# Patient Record
Sex: FEMALE | URBAN - METROPOLITAN AREA
[De-identification: names, ages, dates, MRNs, and addresses within clinical notes are randomized per-mention and may not be internally consistent; named-entity substitution may affect disease eponyms.]

---

## 2018-03-13 ENCOUNTER — CONSULTATION (OUTPATIENT)
Dept: URBAN - METROPOLITAN AREA CLINIC 11 | Facility: CLINIC | Age: 83
End: 2018-03-13

## 2018-03-13 VITALS — HEIGHT: 55 IN | DIASTOLIC BLOOD PRESSURE: 70 MMHG | SYSTOLIC BLOOD PRESSURE: 110 MMHG

## 2018-03-13 ASSESSMENT — VISUAL ACUITY
OD_PH: 20/40+1
OS_SC: 20/200
OD_SC: 20/40-2
OS_PH: 20/70-1

## 2018-03-13 ASSESSMENT — TONOMETRY
OS_IOP_MMHG: 17
OD_IOP_MMHG: 15

## 2018-03-13 NOTE — PATIENT DISCUSSION
Greater than 50% of exam spent in dialogue with patient and her daughter. No SRF seen today on OCT or careful examination. I have asked her to call is she notices any changes or has any concerns.

## 2018-07-30 NOTE — PATIENT DISCUSSION
(U52.696) Keratoconjunct sicca, not specified as Sjogren's, bilateral - Assesment : Examination revealed Dry Eye Syndrome - Plan : Monitor for changes. ATs 2-3 times per day and prn. Refresh Optive coupon given and recommended for use with and without CL wear.

## 2018-07-30 NOTE — PATIENT DISCUSSION
(Z01.00) Encounter for exam of eyes and vision w/o abnormal findings - Assesment : Examination reveals normal health eyes. - Plan : Monitor for changes. Updated CLRx given today. Call if vision changes. RTC in 1 years for CL Exam, sooner if problems or changes.

## 2020-03-17 NOTE — PATIENT DISCUSSION
Monitor for changes. ATs 2-3 times per day and prn. Refresh Optive coupon given and recommended for use with and without CL wear.

## 2020-03-19 NOTE — PATIENT DISCUSSION
Monitor for changes. Updated GLRx and CL Trials given today. Pt to follow with Joanna Rothman for changes. Call if vision changes.

## 2020-03-19 NOTE — PATIENT DISCUSSION
Monitor for changes. ATs 2-3 times per day and prn. Refresh Optive or Refresh Repair recommended for use with and without CL wear.

## 2022-06-18 RX ORDER — AMLODIPINE BESYLATE 5 MG/1
TABLET ORAL
COMMUNITY
Start: 2015-03-10

## 2022-06-28 RX ORDER — DONEPEZIL HYDROCHLORIDE 5 MG/1
TABLET, FILM COATED ORAL
COMMUNITY

## 2022-06-30 RX ORDER — LIDOCAINE 50 MG/G
PATCH TOPICAL
COMMUNITY
Start: 2015-05-29

## 2022-06-30 RX ORDER — TRIAMTERENE AND HYDROCHLOROTHIAZIDE 37.5; 25 MG/1; MG/1
TABLET ORAL
COMMUNITY
End: 2022-10-03

## 2022-06-30 RX ORDER — MEMANTINE HYDROCHLORIDE 28 MG/1
CAPSULE, EXTENDED RELEASE ORAL
COMMUNITY

## 2022-06-30 RX ORDER — OLMESARTAN MEDOXOMIL 40 MG/1
TABLET ORAL
COMMUNITY

## 2022-10-14 PROBLEM — D51.9 VITAMIN B12 DEFICIENCY ANEMIA, UNSPECIFIED: Status: ACTIVE | Noted: 2022-10-14

## 2022-11-02 PROBLEM — D63.1 ANEMIA OF CHRONIC RENAL FAILURE: Status: ACTIVE | Noted: 2022-11-02

## 2022-11-02 PROBLEM — I10 HYPERTENSIVE DISORDER: Status: ACTIVE | Noted: 2022-11-02

## 2022-11-02 PROBLEM — F41.9 ANXIETY: Status: ACTIVE | Noted: 2022-11-02

## 2022-11-02 PROBLEM — G45.9 TRANSIENT CEREBRAL ISCHEMIC ATTACK, UNSPECIFIED: Status: ACTIVE | Noted: 2022-11-02

## 2022-11-02 PROBLEM — Z23 ENCOUNTER FOR IMMUNIZATION: Status: ACTIVE | Noted: 2022-11-02

## 2022-11-02 PROBLEM — J30.9 ALLERGIC RHINITIS, UNSPECIFIED: Status: ACTIVE | Noted: 2022-11-02

## 2022-11-02 PROBLEM — D50.9 IRON DEFICIENCY ANEMIA: Status: ACTIVE | Noted: 2022-11-02

## 2022-11-02 PROBLEM — F03.90 UNSPECIFIED DEMENTIA WITHOUT BEHAVIORAL DISTURBANCE: Status: ACTIVE | Noted: 2022-11-02

## 2022-11-02 PROBLEM — D64.9 ANEMIA: Status: ACTIVE | Noted: 2022-11-02

## 2022-11-02 PROBLEM — L71.9 ROSACEA: Status: ACTIVE | Noted: 2022-11-02

## 2022-11-02 PROBLEM — M51.9 LUMBAR DISC DISEASE: Status: ACTIVE | Noted: 2022-11-02

## 2022-11-02 PROBLEM — F03.90 DEMENTIA (HCC): Status: ACTIVE | Noted: 2022-11-02

## 2022-11-02 PROBLEM — R91.1 SOLITARY PULMONARY NODULE: Status: ACTIVE | Noted: 2019-02-15

## 2022-11-02 PROBLEM — J18.9 PNEUMONITIS: Status: ACTIVE | Noted: 2022-11-02

## 2022-11-02 PROBLEM — H91.93 UNSPECIFIED HEARING LOSS, BILATERAL: Status: ACTIVE | Noted: 2022-11-02

## 2022-11-02 PROBLEM — R68.89 OTHER GENERAL SYMPTOMS AND SIGNS: Status: ACTIVE | Noted: 2022-11-02

## 2022-11-02 PROBLEM — N28.9 RENAL INSUFFICIENCY: Status: ACTIVE | Noted: 2022-11-02

## 2022-11-02 PROBLEM — I10 ESSENTIAL HYPERTENSION: Status: ACTIVE | Noted: 2022-11-02

## 2022-11-02 PROBLEM — R59.9 ADENOPATHY: Status: ACTIVE | Noted: 2022-11-02

## 2022-11-02 PROBLEM — M17.11 PRIMARY OSTEOARTHRITIS OF RIGHT KNEE: Status: ACTIVE | Noted: 2022-11-02

## 2022-11-02 PROBLEM — N18.9 ANEMIA OF CHRONIC RENAL FAILURE: Status: ACTIVE | Noted: 2022-11-02

## 2022-11-02 PROBLEM — D53.1 MEGALOBLASTIC ANEMIA DUE TO VITAMIN B12 DEFICIENCY: Status: ACTIVE | Noted: 2022-11-02
